# Patient Record
(demographics unavailable — no encounter records)

---

## 2025-03-03 NOTE — HISTORY OF PRESENT ILLNESS
[Nocturia] : nocturia [Erectile Dysfunction] : Erectile Dysfunction [None] : None [FreeTextEntry1] : : Sep 25 1968  Referring Provider: DOCTOR,UNKNOWN   HPI: Mr. SALLIE ROCHA is a 53 year yo M with a PMHx notable for urinary frequency over 1 year, nocturia x 3. Stream good quality, has urinary frequency every 2-3 hours. Does endorse some caffeine intake in the evening with tea. Not tried any medication for this. He occasionally feels like he does not empty all the way.  No UTI.   Has been having ED, able to obtain erection, has trouble maintaining erections. No longer having AM erections. Unable to sustain erection for more than 1 minute. Not tried any medications for this.   Anticoagulation: None All: NKDA Social: , 3 children, work in construction; drink socially; never smoker PMHx: HLD, kidney stone last in  FHx: none PSHx: hydrocele repair bilaterally  : Here today after trying the flomax and the daily cialis. Able to sustain erection for 15+ minutes. Urinary frequency down to 50% of prior volume. Nocturia x 1-2. Very happy with erections. Still not limiting his caffeine as much as he could be.    10/10: Here today on 0.4 mg QHS with occasional frequency at nighttime. Discussed importance of limiting caffeine and fluid intake. Cialis working well with erections up to 15+ minutes. Happy with erections. Discussed ditropan for nocturia, not interested. Here today on daily cialis and flomax.  23: Here today after trying 0.4 mg QHS Tamsulosin , tried doubling but had light headedness. Here today with nocturia 1-2x at night. Still happy with the tadalafil and erection quality. PVR today within range, normal at 0 . Interested in trying Ditropan.   10/12/23 Here today for follow up. He has been taking tamsulosin infrequently but no issues with retention. Nocturia x 1. Having good erections with the cialis. PVR= 0cc Pt repots taking taking Cialis daily and took Flomax about 2 months. Nocturia 1x. With some post- void dribbling for which he stands at the toilet for a moment to finish. Complaining of blood in toilet bowl when having BMs. Discussed this may be hemorrhoids, but could also potentially be a serious GI related issue, has referral to GI but has yet to follow up - urged to follow up.  3/3/25 Pt presents for annual f/u for LUTS, ED and PSA Screening- pt states he is doing well overall but would like to increase his dose of Cialis to 10mg as he would like further improvement with urination and ED- pt states he is worried about side effects from taking on demand dose of Cialis 20mg. PVR =0cc.  Nocturia 1x. Denies any UTI, dysuria or blood in urine.   [Urinary Incontinence] : no urinary incontinence [Urinary Retention] : no urinary retention [Urinary Urgency] : no urinary urgency [Urinary Frequency] : no urinary frequency [Straining] : no straining [Weak Stream] : no weak stream [Intermittency] : no intermittency [Post-Void Dribbling] : no post-void dribbling [Dysuria] : no dysuria [Hematuria - Gross] : no gross hematuria

## 2025-03-03 NOTE — ASSESSMENT
[FreeTextEntry1] : 56M here for LUTS, ED and PSA Screening  #LUTS -Continue daily Cialis 1mg, renewal sent - PVR 0 cc  #ED - Continue Cialis 10m- discussed can take 20mg PRN   #PSA Screening  - We discussed the fact that PSA is a nonspecific test for cancer although it is prostate specific. We have reviewed the fact that elevations can be caused by multiple separate processes with the prostate including prostate cancer, benign prostate enlargement, prostate inflammation or infection, or combination of any of the above. We also reviewed that procedures involving catheterizations or manipulation of the prostate including colonoscopy could cause PSA to be elevated. I also have reviewed with the patient that there is age specificity related to PSA and that over time there is some expectation that the PSA will slowly rise over time - PSA 1 year  RTO in 1 year